# Patient Record
Sex: MALE | Race: WHITE
[De-identification: names, ages, dates, MRNs, and addresses within clinical notes are randomized per-mention and may not be internally consistent; named-entity substitution may affect disease eponyms.]

---

## 2019-01-19 ENCOUNTER — HOSPITAL ENCOUNTER (EMERGENCY)
Dept: HOSPITAL 56 - MW.ED | Age: 34
Discharge: HOME | End: 2019-01-19
Payer: COMMERCIAL

## 2019-01-19 DIAGNOSIS — B34.9: ICD-10-CM

## 2019-01-19 DIAGNOSIS — R07.89: Primary | ICD-10-CM

## 2019-01-19 LAB
CHLORIDE SERPL-SCNC: 107 MMOL/L (ref 98–107)
SODIUM SERPL-SCNC: 143 MMOL/L (ref 136–148)

## 2019-01-19 PROCEDURE — 80048 BASIC METABOLIC PNL TOTAL CA: CPT

## 2019-01-19 PROCEDURE — 87804 INFLUENZA ASSAY W/OPTIC: CPT

## 2019-01-19 PROCEDURE — 71045 X-RAY EXAM CHEST 1 VIEW: CPT

## 2019-01-19 PROCEDURE — 93005 ELECTROCARDIOGRAM TRACING: CPT

## 2019-01-19 PROCEDURE — 99285 EMERGENCY DEPT VISIT HI MDM: CPT

## 2019-01-19 PROCEDURE — 85610 PROTHROMBIN TIME: CPT

## 2019-01-19 PROCEDURE — 84484 ASSAY OF TROPONIN QUANT: CPT

## 2019-01-19 PROCEDURE — 85025 COMPLETE CBC W/AUTO DIFF WBC: CPT

## 2019-01-19 PROCEDURE — 36415 COLL VENOUS BLD VENIPUNCTURE: CPT

## 2019-01-19 PROCEDURE — 96360 HYDRATION IV INFUSION INIT: CPT

## 2019-01-19 NOTE — CR
INDICATION:



Chest pain.



TECHNIQUE:



AP portable chest x-ray.



FINDINGS:



Heart is within normal limits. No focal infiltrate or consolidation in 

either lung. Chest otherwise negative without acute disease.



Dictated by Omar Angel MD @ Jan 19 2019  8:21AM



Signed by Dr. Omar Angel @ Jan 19 2019  8:23AM

## 2019-01-19 NOTE — EDM.PDOC
<Sam Griffith - Last Filed: 01/19/19 06:51>





ED HPI GENERAL MEDICAL PROBLEM





- General


Chief Complaint: Chest Pain


Stated Complaint: CHEST PAINS


Time Seen by Provider: 01/19/19 06:49





- History of Present Illness


INITIAL COMMENTS - FREE TEXT/NARRATIVE: 


HISTORY AND PHYSICAL:





History of present illness:


Patient's a 33-year-old white male presents with a concern generalized body 

aches and chest pain he states he felt sick for last couple days developed some 

chest pain yesterday he's had no vomiting diarrhea no palpitations diaphoresis 

he denies trauma he states he's had many sick contacts. He denies influenza 

immunization this year





Review of systems: 


As per history of present illness and below otherwise all systems reviewed and 

negative.





Past medical history: 


As per history of present illness and as reviewed below otherwise 

noncontributory.





Surgical history: 


As per history of present illness and as reviewed below otherwise 

noncontributory.





Social history: 


No reported history of drug or alcohol abuse.





Family history: 


As per history of present illness and as reviewed below otherwise 

noncontributory.





Physical exam:


HEENT: Atraumatic, normocephalic, pupils reactive, negative for conjunctival 

pallor or scleral icterus, mucous membranes moist, throat clear, neck supple, 

nontender, trachea midline.


Lungs: Clear to auscultation, breath sounds equal bilaterally, chest nontender.


Heart: S1S2, regular, negative for clicks, rubs, or JVD.


Abdomen: Soft, nondistended, nontender. Negative for masses or 

hepatosplenomegaly. Negative for costovertebral tenderness.


Pelvis: Stable nontender.


Genitourinary: Deferred.


Rectal: Deferred.


Extremities: Atraumatic, negative for cords or calf pain. Neurovascular 

unremarkable.


Neuro: Awake, alert, oriented. Cranial nerves II through XII unremarkable. 

Cerebellum unremarkable. Motor and sensory unremarkable throughout. Exam 

nonfocal.





Diagnostics:


CBC CMP EKG chest x-ray influenza screen





Therapeutics:


Saline 1 L bolus





Impression: 


#1 atypical chest pain #2 viral syndrome





Definitive disposition and diagnosis as appropriate pending reevaluation and 

review of above.








ED ROS GENERAL





- Review of Systems


Review Of Systems: ROS reveals no pertinent complaints other than HPI.





ED EXAM, GENERAL





- Physical Exam


Exam: See Below (See dictation)





Course





- Vital Signs


Last Recorded V/S: 





 Last Vital Signs











Temp  97.3 F   01/19/19 06:51


 


Pulse  78   01/19/19 06:51


 


Resp  17   01/19/19 06:51


 


BP  130/83   01/19/19 06:51


 


Pulse Ox  97   01/19/19 06:51














- Orders/Labs/Meds


Orders: 





 Active Orders 24 hr











 Category Date Time Status


 


 Cardiac Monitoring [RC] .AS DIRECTED Care  01/19/19 06:54 Active


 


 EKG Documentation Completion [RC] STAT Care  01/19/19 06:53 Active











Labs: 





 Laboratory Tests











  01/19/19 01/19/19 01/19/19 Range/Units





  06:58 06:58 06:58 


 


WBC  11.70 H    (4.0-11.0)  K/uL


 


RBC  4.70    (4.50-5.90)  M/uL


 


Hgb  15.1    (13.0-17.0)  g/dL


 


Hct  42.1    (38.0-50.0)  %


 


MCV  89.6    (80.0-98.0)  fL


 


MCH  32.1 H    (27.0-32.0)  pg


 


MCHC  35.9    (31.0-37.0)  g/dL


 


RDW Std Deviation  39.1    (28.0-62.0)  fl


 


RDW Coeff of Lawson  12    (11.0-15.0)  %


 


Plt Count  193    (150-400)  K/uL


 


MPV  9.70    (7.40-12.00)  fL


 


Neut % (Auto)  68.1    (48.0-80.0)  %


 


Lymph % (Auto)  24.5    (16.0-40.0)  %


 


Mono % (Auto)  5.6    (0.0-15.0)  %


 


Eos % (Auto)  1.5    (0.0-7.0)  %


 


Baso % (Auto)  0.3    (0.0-1.5)  %


 


Neut # (Auto)  8.0 H    (1.4-5.7)  K/uL


 


Lymph # (Auto)  2.9 H    (0.6-2.4)  K/uL


 


Mono # (Auto)  0.7    (0.0-0.8)  K/uL


 


Eos # (Auto)  0.2    (0.0-0.7)  K/uL


 


Baso # (Auto)  0.0    (0.0-0.1)  K/uL


 


Nucleated RBC %  0.0    /100WBC


 


Nucleated RBCs #  0    K/uL


 


INR   1.03   


 


Sodium    143  (136-148)  mmol/L


 


Potassium    3.5  (3.5-5.1)  mmol/L


 


Chloride    107  ()  mmol/L


 


Carbon Dioxide    26.2  (21.0-32.0)  mmol/L


 


BUN    12  (7.0-18.0)  mg/dL


 


Creatinine    1.2  (0.8-1.3)  mg/dL


 


Est Cr Clr Drug Dosing    90.41  mL/min


 


Estimated GFR (MDRD)    > 60.0  ml/min


 


Glucose    127 H  ()  mg/dL


 


Calcium    9.6  (8.5-10.1)  mg/dL


 


Troponin I    < 0.050  (0.000-0.056)  ng/mL











Meds: 





Medications














Discontinued Medications














Generic Name Dose Route Start Last Admin





  Trade Name Freq  PRN Reason Stop Dose Admin


 


Sodium Chloride  1,000 mls @ 999 mls/hr  01/19/19 06:55  01/19/19 07:02





  Normal Saline  IV  01/19/19 07:55  999 mls/hr





  .Bolus ONE   Administration





     





     





     





     














Departure





- Departure


Disposition: Home, Self-Care 01


Clinical Impression: 


 Viral syndrome, Chest pain








- Discharge Information


Instructions:  Nonspecific Chest Pain, Easy-to-Read


Referrals: 


PCP,None [Primary Care Provider] - 


Forms:  ED Department Discharge


Additional Instructions: 


The following information is given to patients seen in the emergency department 

who are being discharged to home. This information is to outline your options 

for follow-up care. We provide all patients seen in our emergency department 

with a follow-up referral.





The need for follow-up, as well as the timing and circumstances, are variable 

depending upon the specifics of your emergency department visit.





If you don't have a primary care physician on staff, we will provide you with a 

referral. We always advise you to contact your personal physician following an 

emergency department visit to inform them of the circumstance of the visit and 

for follow-up with them and/or the need for any referrals to a consulting 

specialist.





The emergency department will also refer you to a specialist when appropriate. 

This referral assures that you have the opportunity for follow-up care with a 

specialist. All of these measure are taken in an effort to provide you with 

optimal care, which includes your follow-up.





Under all circumstances we always encourage you to contact your private 

physician who remains a resource for coordinating your care. When calling for 

follow-up care, please make the office aware that this follow-up is from your 

recent emergency room visit. If for any reason you are refused follow-up, 

please contact the [facility] emergency department at [phone] 

and asked to speak to the emergency department charge nurse.








<Minesh Curran - Last Filed: 01/19/19 08:53>





Departure





- Departure


Time of Disposition: 08:52


Condition: Good

## 2019-02-28 ENCOUNTER — HOSPITAL ENCOUNTER (EMERGENCY)
Dept: HOSPITAL 56 - MW.ED | Age: 34
Discharge: HOME | End: 2019-02-28
Payer: COMMERCIAL

## 2019-02-28 DIAGNOSIS — M54.5: Primary | ICD-10-CM

## 2019-02-28 PROCEDURE — 96372 THER/PROPH/DIAG INJ SC/IM: CPT

## 2019-02-28 PROCEDURE — 99282 EMERGENCY DEPT VISIT SF MDM: CPT

## 2019-02-28 NOTE — EDM.PDOC
ED HPI GENERAL MEDICAL PROBLEM





- General


Chief Complaint: Back Pain or Injury


Stated Complaint: BACK PAIN


Time Seen by Provider: 02/28/19 17:01


Source of Information: Reports: Patient


History Limitations: Reports: No Limitations





- History of Present Illness


INITIAL COMMENTS - FREE TEXT/NARRATIVE: 


HISTORY AND PHYSICAL:





History of present illness:


Patient is a 33-year-old male here with complaint of low back pain x 2 days. He 

denies any specific injury but states he does a lot of lifting at work. He has 

history of herniated discs and had laser spine surgery in 2015. He reports pain 

down the lateral aspect of his right leg. Denies saddle anesthesia, lower 

extremity weakness or foot drop, loss of bowel or bladder control, fevers or 

chills. He states he's been having some increased pain in his back of the past 

few months and has an appointment with a neurosurgeon back [place] in [place]. 





Review of systems: 


As per history of present illness and below otherwise all systems reviewed and 

negative.





Past medical history: 


As per history of present illness and as reviewed below otherwise 

noncontributory.





Surgical history: 


As per history of present illness and as reviewed below otherwise 

noncontributory.





Social history: 


No reported history of drug or alcohol abuse.





Family history: 


As per history of present illness and as reviewed below otherwise 

noncontributory.





Physical exam:


General: Patient sitting comfortably in no acute distress and nontoxic appearing


HEENT: Atraumatic, normocephalic, pupils reactive, negative for conjunctival 

pallor or scleral icterus, mucous membranes moist, throat clear, neck supple, 

nontender, trachea midline. No meningeal signs. 


Lungs: Clear to auscultation, breath sounds equal bilaterally, chest nontender.


Heart: S1S2, regular, negative for clicks, rubs, or overt murmur.


Abdomen: Soft, nondistended, nontender. Negative for masses or 

hepatosplenomegaly. Negative for costovertebral tenderness.


Pelvis: Stable nontender.


Genitourinary: Deferred.


Rectal: Deferred.


Spine: Pain to palpation of lumbar spine and paraspinals bilaterally. Lower 

extremity strength 5/5 bilaterally. 


Extremities: Atraumatic, negative for cords or calf pain. Neurovascular 

unremarkable.


Neuro: Awake, alert, oriented. Cranial nerves II through XII unremarkable. 

Cerebellum unremarkable. Motor and sensory unremarkable throughout. Exam 

nonfocal.





Notes: 





Diagnostics:


Declined lumbar x-ray 





Therapeutics:


60mg Toradol IM


60mg Norflex IM 





Prescriptions:


Tramadol (#12) 


Flexeril (#10) 





Impression: 


Lumbar back pain 





Plan:


1. Heat or ice and tylenol or motrin as needed. You may take tramadol and 

norflex as needed as discussed, do no take while driving as it may make you 

drowsy


2. Follow up with primary care provider 


3. Return to ED as needed as discussed 





Definitive disposition and diagnosis as appropriate pending reevaluation and 

review of above.





  ** Lower back


Pain Score (Numeric/FACES): 9





- Related Data


 Allergies











Allergy/AdvReac Type Severity Reaction Status Date / Time


 


No Known Allergies Allergy   Verified 02/28/19 16:36











Home Meds: 


 Home Meds





. [No Known Home Meds]  01/19/19 [History]











Past Medical History


HEENT History: Reports: Impaired Vision, Other (See Below)


Other HEENT History: wears glasses


Musculoskeletal History: Reports: Back Pain, Chronic





- Past Surgical History


HEENT Surgical History: Reports: None


Musculoskeletal Surgical History: Reports: Other (See Below)


Other Musculoskeletal Surgeries/Procedures:: back surgery- L4-L5





Social & Family History





- Family History


Family Medical History: Noncontributory





- Tobacco Use


Years of Tobacco use: 1





- Caffeine Use


Caffeine Use: Reports: Soda





- Recreational Drug Use


Recreational Drug Use: No





ED ROS GENERAL





- Review of Systems


Review Of Systems: ROS reveals no pertinent complaints other than HPI.





ED EXAM,LOWER BACK PAIN/INJURY





- Physical Exam


Exam: See Below (see dictation)





Course





- Vital Signs


Last Recorded V/S: 





 Last Vital Signs











Temp  97.5 F   02/28/19 16:40


 


Pulse  89   02/28/19 16:40


 


Resp  18   02/28/19 16:40


 


BP  104/74   02/28/19 16:40


 


Pulse Ox  95   02/28/19 16:40














- Orders/Labs/Meds


Orders: 





 Active Orders 24 hr











 Category Date Time Status


 


 Orphenadrine [Norflex] Med  02/28/19 17:00 Active





 60 mg IM Q12H   








 Medication Orders





Orphenadrine Citrate (Norflex)  60 mg IM Q12H TESSIE








Meds: 





Medications











Generic Name Dose Route Start Last Admin





  Trade Name Freq  PRN Reason Stop Dose Admin


 


Orphenadrine Citrate  60 mg  02/28/19 17:00  





  Norflex  IM   





  Q12H TESSIE   





     





     





     





     














Discontinued Medications














Generic Name Dose Route Start Last Admin





  Trade Name Shweta  PRN Reason Stop Dose Admin


 


Ketorolac Tromethamine  60 mg  02/28/19 16:59  





  Toradol  IM  02/28/19 17:00  





  ONETIME ONE   





     





     





     





     














Departure





- Departure


Time of Disposition: 17:15


Disposition: Home, Self-Care 01


Condition: Good


Clinical Impression: 


 Lumbar back pain








- Discharge Information


Referrals: 


PCP,None [Primary Care Provider] - 


Additional Instructions: 


The following information is given to patients seen in the emergency department 

who are being discharged to home. This information is to outline your options 

for follow-up care. We provide all patients seen in our emergency department 

with a follow-up referral.





The need for follow-up, as well as the timing and circumstances, are variable 

depending upon the specifics of your emergency department visit.





If you don't have a primary care physician on staff, we will provide you with a 

referral. We always advise you to contact your personal physician following an 

emergency department visit to inform them of the circumstance of the visit and 

for follow-up with them and/or the need for any referrals to a consulting 

specialist.





The emergency department will also refer you to a specialist when appropriate. 

This referral assures that you have the opportunity for follow-up care with a 

specialist. All of these measure are taken in an effort to provide you with 

optimal care, which includes your follow-up.





Under all circumstances we always encourage you to contact your private 

physician who remains a resource for coordinating your care. When calling for 

follow-up care, please make the office aware that this follow-up is from your 

recent emergency room visit. If for any reason you are refused follow-up, 

please contact the [facility] Emergency 

Department at [phone] and asked to speak to the emergency department 

charge nurse.





[facility]


Primary Care


[address] [place] [id number]


Phone: [phone]


Fax: [phone]





[address] [address]


Phone: [phone]


Fax: [phone]





1. Heat or ice and tylenol or motrin as needed. You may take tramadol and 

norflex as needed as discussed, do no take while driving as it may make you 

drowsy


2. Follow up with primary care provider 


3. Return to ED as needed as discussed 





- My Orders


Last 24 Hours: 





My Active Orders





02/28/19 17:00


Orphenadrine [Norflex]   60 mg IM Q12H 














- Assessment/Plan


Last 24 Hours: 





My Active Orders





02/28/19 17:00


Orphenadrine [Norflex]   60 mg IM Q12H